# Patient Record
(demographics unavailable — no encounter records)

---

## 2020-02-07 PROBLEM — L29.9 ITCHING: Status: ACTIVE | Noted: 2020-02-07

## 2020-07-09 PROBLEM — H25.813 COMBINED FORMS OF AGE-RELATED CATARACT OF BOTH EYES: Status: ACTIVE | Noted: 2020-07-09

## 2020-08-21 PROBLEM — Z01.818 PRE-OP EXAM: Status: ACTIVE | Noted: 2020-08-21

## 2021-04-21 DIAGNOSIS — Z12.11 COLON CANCER SCREENING: ICD-10-CM

## 2021-04-21 DIAGNOSIS — Z11.59 NEED FOR HEPATITIS C SCREENING TEST: ICD-10-CM

## 2022-02-22 PROBLEM — R07.9 CHEST PAIN, RULE OUT ACUTE MYOCARDIAL INFARCTION: Status: ACTIVE | Noted: 2022-02-22

## 2022-02-22 PROBLEM — N17.9 AKI (ACUTE KIDNEY INJURY): Status: ACTIVE | Noted: 2022-02-22

## 2022-02-23 PROBLEM — R07.9 CHEST PAIN, RULE OUT ACUTE MYOCARDIAL INFARCTION: Status: RESOLVED | Noted: 2022-02-22 | Resolved: 2022-02-23

## 2022-02-23 PROBLEM — N17.9 AKI (ACUTE KIDNEY INJURY): Status: RESOLVED | Noted: 2022-02-22 | Resolved: 2022-02-23

## 2022-03-29 PROBLEM — H40.013 OPEN ANGLE WITH BORDERLINE FINDINGS AND LOW GLAUCOMA RISK IN BOTH EYES: Status: ACTIVE | Noted: 2022-03-29

## 2022-03-29 PROBLEM — H25.813 COMBINED FORMS OF AGE-RELATED CATARACT OF BOTH EYES: Status: RESOLVED | Noted: 2020-07-09 | Resolved: 2022-03-29

## 2022-03-29 PROBLEM — Z01.818 PRE-OP EXAM: Status: RESOLVED | Noted: 2020-08-21 | Resolved: 2022-03-29

## 2022-03-29 PROBLEM — H35.033 HYPERTENSIVE RETINOPATHY OF BOTH EYES, GRADE 1: Status: ACTIVE | Noted: 2022-03-29

## 2022-03-29 PROBLEM — H04.123 DRY EYE SYNDROME, BILATERAL: Status: ACTIVE | Noted: 2022-03-29

## 2022-05-23 PROBLEM — R07.9 CHEST PAIN: Status: ACTIVE | Noted: 2022-05-23

## 2022-05-23 PROBLEM — I10 HYPERTENSION: Status: ACTIVE | Noted: 2022-05-23
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
## 2024-02-18 PROBLEM — S01.01XA SCALP LACERATION, INITIAL ENCOUNTER: Status: ACTIVE | Noted: 2024-02-18

## 2024-04-05 PROBLEM — T81.30XA WOUND DEHISCENCE: Status: ACTIVE | Noted: 2024-04-05

## 2024-10-24 DIAGNOSIS — I10 ESSENTIAL HYPERTENSION: ICD-10-CM

## 2024-10-24 RX ORDER — LISINOPRIL 40 MG/1
40 TABLET ORAL
Qty: 90 TABLET | Refills: 3 | Status: SHIPPED | OUTPATIENT
Start: 2024-10-24

## 2024-10-24 NOTE — TELEPHONE ENCOUNTER
No care due was identified.  [facility] Embedded Care Due Messages. Reference number: [id number].   10/24/2024 12:17:02 PM CDT

## 2024-10-25 NOTE — TELEPHONE ENCOUNTER
Refill Decision Note   Rossy Garcia  is requesting a refill authorization.  Brief Assessment and Rationale for Refill:  Approve     Medication Therapy Plan:         Comments:     Note composed:11:00 PM 10/24/2024